# Patient Record
Sex: MALE | Race: WHITE | NOT HISPANIC OR LATINO | Employment: UNEMPLOYED | URBAN - METROPOLITAN AREA
[De-identification: names, ages, dates, MRNs, and addresses within clinical notes are randomized per-mention and may not be internally consistent; named-entity substitution may affect disease eponyms.]

---

## 2021-09-24 ENCOUNTER — OFFICE VISIT (OUTPATIENT)
Dept: URGENT CARE | Facility: URGENT CARE | Age: 51
End: 2021-09-24
Payer: COMMERCIAL

## 2021-09-24 VITALS
HEART RATE: 57 BPM | BODY MASS INDEX: 36.96 KG/M2 | OXYGEN SATURATION: 98 % | TEMPERATURE: 98.8 F | HEIGHT: 77 IN | SYSTOLIC BLOOD PRESSURE: 120 MMHG | WEIGHT: 313 LBS | DIASTOLIC BLOOD PRESSURE: 82 MMHG

## 2021-09-24 DIAGNOSIS — H93.8X2 CONGESTION OF LEFT EAR: Primary | ICD-10-CM

## 2021-09-24 DIAGNOSIS — R42 DIZZINESS: ICD-10-CM

## 2021-09-24 PROCEDURE — 99203 OFFICE O/P NEW LOW 30 MIN: CPT | Performed by: FAMILY MEDICINE

## 2021-09-24 RX ORDER — AMOXICILLIN 875 MG
875 TABLET ORAL 2 TIMES DAILY
Qty: 20 TABLET | Refills: 0 | Status: SHIPPED | OUTPATIENT
Start: 2021-09-24 | End: 2021-10-04

## 2021-09-24 RX ORDER — MECLIZINE HYDROCHLORIDE 25 MG/1
25 TABLET ORAL 3 TIMES DAILY PRN
Qty: 20 TABLET | Refills: 0 | Status: SHIPPED | OUTPATIENT
Start: 2021-09-24

## 2021-09-24 RX ORDER — WARFARIN SODIUM 7.5 MG/1
7.5 TABLET ORAL DAILY
COMMUNITY

## 2021-09-24 RX ORDER — METOPROLOL TARTRATE 50 MG
50 TABLET ORAL 2 TIMES DAILY
COMMUNITY

## 2021-09-24 RX ORDER — LOSARTAN POTASSIUM 25 MG/1
25 TABLET ORAL DAILY
COMMUNITY

## 2021-09-24 RX ORDER — WARFARIN SODIUM 10 MG/1
10 TABLET ORAL DAILY
COMMUNITY

## 2021-09-24 ASSESSMENT — MIFFLIN-ST. JEOR: SCORE: 2407.14

## 2021-09-24 NOTE — PROGRESS NOTES
"Assessment & Plan     Congestion of left ear  - amoxicillin (AMOXIL) 875 MG tablet  Dispense: 20 tablet; Refill: 0    Dizziness  - meclizine (ANTIVERT) 25 MG tablet  Dispense: 20 tablet; Refill: 0     May be allergy related causing fluid accumulation I do not see an outright infection on this affected side but has a hx of ear infections I am fine if he wants to start antibiotics now otherwise can wait to see if symptoms resolves.     Nasal fluticasone, oral decongestant also recommended    See AVS summary for additional recommendations reviewed with patient during this visit.         Jordan Blanchard MD   [facility]    Subjective     Alfredo is a 50 year old male who presents to clinic today for the following health issues:  Chief Complaint   Patient presents with     Urgent Care     Ear Problem     Felt had fluid in left ear earlier this week; earlier today bent over to pick an item up, suddenly felt very disoriented and sweaty, room seemed to be spinning, has felt imbalanced with walking since then.  Feels pressure in ear.     HPI    Vertigo symptoms in the past before instigated by ear infection  He did have some postnasal drip and thought what were allergies starting several days ago -- he did experience some echo in his head. Bent over earlier today and room began to feel like he's on a boat.     HE denies fever/cough.     Remedies attempted: none    Accompanied by his wife    There are no problems to display for this patient.      Current Outpatient Medications   Medication     amoxicillin (AMOXIL) 875 MG tablet     losartan (COZAAR) 25 MG tablet     meclizine (ANTIVERT) 25 MG tablet     metoprolol tartrate (LOPRESSOR) 50 MG tablet     warfarin ANTICOAGULANT (COUMADIN) 10 MG tablet     warfarin ANTICOAGULANT (COUMADIN) 7.5 MG tablet     No current facility-administered medications for this visit.           Objective    /82   Pulse 57   Temp 98.8  F (37.1  C) (Oral)   Ht 1.956 m (6' 5\")   " Wt 142 kg (313 lb)   SpO2 98%   BMI 37.12 kg/m    Physical Exam   Ears: normal right TM, left anterior TM there is a collection of bulging fluid which is clear the remaining TM is slightly congested without erythema. No perforation seen    No results found for any visits on 09/24/21.                  The use of Dragon/[employer] dictation services may have been used to construct the content in this note; any grammatical or spelling errors are non-intentional. Please contact the author of this note directly if you are in need of any clarification.

## 2021-09-24 NOTE — PATIENT INSTRUCTIONS
Nasal fluticasone spray (generic version of flonase)   Use once daily in each nostril. Tilt head forward and spray upwards into nasal passage, then plug that side of the nostril for 5 seconds. Repeat on other side.   Benefits are not immediate but will gradually treat your condition of allergies/sinus inflammation.      A few days of oral decongestant such as sudafed or equivalent      Amoxicillin twice daily for 10 days       Meclizine every 8 hours as needed for dizziness      Drink 100 ounces of water a day to keep hydrated        Return as needed if symptoms worsen or if not better in next 2-3 days